# Patient Record
Sex: MALE | Race: BLACK OR AFRICAN AMERICAN | NOT HISPANIC OR LATINO | Employment: UNEMPLOYED | URBAN - METROPOLITAN AREA
[De-identification: names, ages, dates, MRNs, and addresses within clinical notes are randomized per-mention and may not be internally consistent; named-entity substitution may affect disease eponyms.]

---

## 2023-01-07 ENCOUNTER — HOSPITAL ENCOUNTER (EMERGENCY)
Facility: HOSPITAL | Age: 53
Discharge: HOME | End: 2023-01-07
Attending: EMERGENCY MEDICINE

## 2023-01-07 VITALS
SYSTOLIC BLOOD PRESSURE: 160 MMHG | WEIGHT: 195 LBS | RESPIRATION RATE: 16 BRPM | BODY MASS INDEX: 27.3 KG/M2 | OXYGEN SATURATION: 97 % | DIASTOLIC BLOOD PRESSURE: 90 MMHG | HEIGHT: 71 IN | TEMPERATURE: 98.4 F | HEART RATE: 90 BPM

## 2023-01-07 DIAGNOSIS — B02.9 HERPES ZOSTER WITHOUT COMPLICATION: Primary | ICD-10-CM

## 2023-01-07 LAB
HIV 1+2 AB+HIV1 P24 AG SERPL QL IA: NORMAL
HIV1 P24 AG SER QL: NORMAL

## 2023-01-07 RX ORDER — TETRACAINE HYDROCHLORIDE 5 MG/ML
2 SOLUTION OPHTHALMIC ONCE
Status: COMPLETED | OUTPATIENT
Start: 2023-01-07 | End: 2023-01-07

## 2023-01-07 RX ADMIN — FLUORESCEIN SODIUM 1 STRIP: 0.6 STRIP OPHTHALMIC at 03:58

## 2023-01-07 RX ADMIN — TETRACAINE HYDROCHLORIDE 2 DROP: 5 SOLUTION OPHTHALMIC at 03:58

## 2023-01-07 NOTE — DISCHARGE INSTRUCTIONS
Continue to take medications as directed by provider that evaluated you on 1/5/2023  I recommend that you call the number provided to schedule an appointment with a primary care provider within the next 1 week for reevaluation  Follow-up with ophthalmology  If you begin to have any eye pain, changes to vision and other signs/symptoms as discussed at bedside return to the emergency department for evaluation

## 2023-01-07 NOTE — ED PROVIDER NOTES
History  Chief Complaint   Patient presents with   • Rash     Pt c/o rash to R side of face  States it started last week  Diagnosed with shingles one week ago  C/o worsening pain to rash  Currently on prednisone, valtrex and percocet  Chris Kyle is a 46 y o  male       With a past medical history for CKD, sarcoidosis presenting today for right-sided facial rash  The patient was evaluated at [facility] for same on 1/5/2023  At that time he was diagnosed with herpes zoster  Patient presenting today because rash continues to be painful  Reporting eye pain  He had lesion inside of the mouth as well  Which was present during his evaluation at [facility]  Of ear pain as well  Patient has had symptoms like this in the past with previous herpes zoster flareup  He is currently on prednisone and Valtrex  Denies any further complaints at this time  None       Past Medical History:   Diagnosis Date   • CKD (chronic kidney disease)    • Sarcoidosis of lung ([facility] [address] )        No past surgical history on file  No family history on file  I have reviewed and agree with the history as documented  E-Cigarette/Vaping     E-Cigarette/Vaping Substances          Review of Systems   Skin: Positive for rash  All other systems reviewed and are negative  Physical Exam  Physical Exam  Vitals and nursing note reviewed  Constitutional:       General: He is not in acute distress  Appearance: He is well-developed  HENT:      Head: Normocephalic and atraumatic  Nose: Nose normal  No congestion  Mouth/Throat:      Mouth: Mucous membranes are moist       Pharynx: No posterior oropharyngeal erythema  Eyes:      Conjunctiva/sclera: Conjunctivae normal    Cardiovascular:      Rate and Rhythm: Normal rate and regular rhythm  Heart sounds: No murmur heard  Pulmonary:      Effort: Pulmonary effort is normal  No respiratory distress  Breath sounds: Normal breath sounds  Abdominal:      Palpations: Abdomen is soft  Tenderness: There is no abdominal tenderness  Musculoskeletal:         General: No swelling  Cervical back: Neck supple  Skin:     General: Skin is warm and dry  Capillary Refill: Capillary refill takes less than 2 seconds  Comments: right side of face with maculopapular rash to what appears to be branches of trigeminal nerve  Neurological:      General: No focal deficit present  Mental Status: He is alert and oriented to person, place, and time  Cranial Nerves: No cranial nerve deficit  Sensory: No sensory deficit  Motor: No weakness  Psychiatric:         Mood and Affect: Mood normal          Vital Signs  ED Triage Vitals [01/07/23 0217]   Temperature Pulse Respirations Blood Pressure SpO2   98 4 °F (36 9 °C) 90 16 160/90 97 %      Temp src Heart Rate Source Patient Position - Orthostatic VS BP Location FiO2 (%)   -- -- Sitting Left arm --      Pain Score       7           Vitals:    01/07/23 0217   BP: 160/90   Pulse: 90   Patient Position - Orthostatic VS: Sitting         Visual Acuity      ED Medications  Medications   fluorescein sodium sterile ophthalmic strip 1 strip (1 strip Right Eye Given by Other 1/7/23 0358)   tetracaine 0 5 % ophthalmic solution 2 drop (2 drops Right Eye Given by Other 1/7/23 0358)       Diagnostic Studies  Results Reviewed     Procedure Component Value Units Date/Time    RAPID HIV 1/2 AB-AG COMBO for 15years old and above [[id number]]  (Normal) Collected: 01/07/23 0320    Lab Status: Final result Specimen: Blood from Arm, Left Updated: 01/07/23 0400     Rapid HIV 1 AND 2 Non-Reactive     HIV-1 P24 Ag Screen Non-Reactive    Narrative:      Negative for HIV-1 p24 Antigen  Negative for HIV-1 and/or HIV-2 Antibody                   No orders to display              Procedures  Procedures         ED Course  ED Course as of 01/07/23 0506   Sat Jan 07, 2023   0346 Fluoroscein exam did not reveal any dendritic lesions, corneal abrasions, no milvia sign  Medical Decision Making  40-year-old male presenting today with painful rash to the right side of face  Recently diagnosed with herpes zoster, noted that herpes zoster diffuse on right side of face, however it is following the trigeminal nerve location, this is a unusual presentation for herpes zoster, however the rash appears consistent with this diagnosis and painful rash consistent with that as well  Differential diagnosis was broad and included but was not limited to other viral rash, SJS/TEN, scabies, less likely meningeal rash  This was discussed with the patient at bedside  I recommended very close follow-up with primary care provider and ophthalmology  I did a thorough eye examination using fluorescein, there was no Milvia sign, no dendritic lesions of the cornea, no other abnormalities noted  There is no swelling in or around the mouth, however the patient did have 1 small lesion to the inner cheek  This was about 1 cm x 1 cm with no swelling  I gave the patient's strict return criteria he demonstrated understanding  All questions answered appropriately  Herpes zoster without complication: acute illness or injury  Amount and/or Complexity of Data Reviewed  Labs: ordered  Risk  Prescription drug management  Disposition  Final diagnoses:   Herpes zoster without complication     Time reflects when diagnosis was documented in both MDM as applicable and the Disposition within this note     Time User Action Codes Description Comment    1/7/2023  4:40 AM Jaylene Precise Add [B02 9] Herpes zoster without complication       ED Disposition     ED Disposition   Discharge    Condition   Stable    Date/Time   Sat Jan 7, 2023  4:11 AM    Sukhjinder Cesar discharge to home/self care                 Follow-up Information     Follow up With Specialties Details Why Contact Info Additional 2000 [facility] Emergency Medicine Go to  If symptoms worsen 34 [facility] 109 [facility], [address] [place], [place], 510 [facility]  Call today To schedule an appointment for follow-up with primary care provider within the next 1 week for reevaluation  [address] Call today To schedule an appointment for follow-up 174 [facility] 830 [facility]  [phone]             There are no discharge medications for this patient            PDMP Review     None          ED Provider  Electronically Signed by           Petra Messer PA-C  01/07/23 4295

## 2023-01-19 ENCOUNTER — HOSPITAL ENCOUNTER (EMERGENCY)
Facility: HOSPITAL | Age: 53
Discharge: HOME | End: 2023-01-19
Attending: EMERGENCY MEDICINE

## 2023-01-19 VITALS
DIASTOLIC BLOOD PRESSURE: 99 MMHG | OXYGEN SATURATION: 97 % | HEART RATE: 105 BPM | SYSTOLIC BLOOD PRESSURE: 164 MMHG | TEMPERATURE: 98.8 F | RESPIRATION RATE: 18 BRPM

## 2023-01-19 DIAGNOSIS — H60.90 OTITIS EXTERNA: Primary | ICD-10-CM

## 2023-01-19 RX ORDER — OFLOXACIN 3 MG/ML
1 SOLUTION/ DROPS OPHTHALMIC 4 TIMES DAILY
Status: DISCONTINUED | OUTPATIENT
Start: 2023-01-19 | End: 2023-01-19 | Stop reason: HOSPADM

## 2023-01-19 RX ORDER — OFLOXACIN 3 MG/ML
5 SOLUTION AURICULAR (OTIC) DAILY
Qty: 1.75 ML | Refills: 0 | Status: SHIPPED | OUTPATIENT
Start: 2023-01-19 | End: 2023-01-26

## 2023-01-19 RX ORDER — AMOXICILLIN 500 MG/1
1000 CAPSULE ORAL 2 TIMES DAILY
Qty: 40 CAPSULE | Refills: 0 | Status: SHIPPED | OUTPATIENT
Start: 2023-01-19 | End: 2023-01-26

## 2023-01-19 RX ADMIN — OFLOXACIN 1 DROP: 3 SOLUTION/ DROPS OPHTHALMIC at 13:18

## 2023-01-19 NOTE — ED PROVIDER NOTES
History  Chief Complaint   Patient presents with   • Earache     Pt states having throbbing ear pain on right side with headache  Pt states recently treated for shingles on right side  Pt states taking tylenol no relief  Patient is a 70-year-old male past medical history of sarcoidosis, CKD presenting with ear pain  Patient states that he was recently treated for shingles and had resolution of rash to the right side of his face and states that the ear pain he was initially feeling with his shingles did improve however worsened again over the last 1 5 weeks  Notes constant throbbing pain to the area which is worse when he put a Q-tip in, radiating into his face and throat with swallowing  States he completed his shingles medication  Denies any drainage from his ear denies any fevers, upper respiratory symptoms, cough  Is taking Tylenol with minimal relief  None       Past Medical History:   Diagnosis Date   • CKD (chronic kidney disease)    • Sarcoidosis of lung ([facility] [address] )    • Shingles        No past surgical history on file  No family history on file  I have reviewed and agree with the history as documented  E-Cigarette/Vaping     E-Cigarette/Vaping Substances          Review of Systems   All other systems reviewed and are negative  Physical Exam  Physical Exam  Vitals reviewed  Constitutional:       General: He is not in acute distress  Appearance: Normal appearance  He is not ill-appearing  HENT:      Left Ear: Tympanic membrane and ear canal normal       Ears:      Comments: Patient has mild bulging of the TM with clear effusion, mild canal erythema as compared to the left which is normal, no mastoid tenderness, erythema or edema, no vesicles on the TM, no perforation     Nose: Nose normal       Mouth/Throat:      Mouth: Mucous membranes are moist    Eyes:      Conjunctiva/sclera: Conjunctivae normal    Cardiovascular:      Rate and Rhythm: Normal rate     Pulmonary:      Effort: Pulmonary effort is normal    Abdominal:      General: Abdomen is flat  Musculoskeletal:      Cervical back: Neck supple  Lymphadenopathy:      Cervical: Cervical adenopathy present  Skin:     General: Skin is warm and dry  Neurological:      General: No focal deficit present  Mental Status: He is alert  Psychiatric:         Mood and Affect: Mood normal          Vital Signs  ED Triage Vitals [01/19/23 1231]   Temperature Pulse Respirations Blood Pressure SpO2   98 8 °F (37 1 °C) 105 18 164/99 97 %      Temp Source Heart Rate Source Patient Position - Orthostatic VS BP Location FiO2 (%)   Oral Monitor -- Left arm --      Pain Score       --           Vitals:    01/19/23 1231   BP: 164/99   Pulse: 105         Visual Acuity      ED Medications  Medications   ofloxacin (OCUFLOX) 0 3 % ophthalmic solution 1 drop (has no administration in time range)       Diagnostic Studies  Results Reviewed     None                 No orders to display              Procedures  Procedures         ED Course                                             Medical Decision Making  Patient is a 66-year-old male past medical history of sarcoidosis, CKD presenting with ear pain  Patient is well-appearing at bedside with stable vitals and in no acute distress  On exam he has mild erythema of the canal, tenderness of the canal without otorrhea, mild bulging of the TM with clear effusion  Differential includes postherpetic neuralgia, otitis externa, otitis media  As patient has mild bulging of the TM and has more erythema to the canal with tenderness canal will give ofloxacin for otitis externa, have given prescription for amoxicillin that I have advised to begin if you notice worsening symptoms or fevers within the next several days and have advised ENT follow-up for otitis versus postherpetic neuralgia  Patient states he understands      Otitis externa: complicated acute illness or injury  Risk  Prescription drug management  Disposition  Final diagnoses:   Otitis externa     Time reflects when diagnosis was documented in both MDM as applicable and the Disposition within this note     Time User Action Codes Description Comment    1/19/2023  1:09 PM Tarik Short [H60 90] Otitis externa       ED Disposition     ED Disposition   Discharge    Condition   Stable    Date/Time   Thu Jan 19, 2023  1:11 PM    Comment   Mckenna Felipe discharge to home/self care  Follow-up Information     Follow up With Specialties Details Why Contact Info Additional Information    [facility] Schedule an appointment as soon as possible for a visit   [address] [place] [address], [address] [place] [address]          Patient's Medications   Discharge Prescriptions    AMOXICILLIN (AMOXIL) 500 MG CAPSULE    Take 2 capsules (1,000 mg total) by mouth 2 (two) times a day for 7 days       Start Date: 1/19/2023 End Date: 1/26/2023       Order Dose: 1,000 mg       Quantity: 40 capsule    Refills: 0    OFLOXACIN (FLOXIN) 0 3 % OTIC SOLUTION    Administer 5 drops to the right ear daily for 7 days       Start Date: 1/19/2023 End Date: 1/26/2023       Order Dose: 5 drops       Quantity: 1 75 mL    Refills: 0       No discharge procedures on file      PDMP Review     None          ED Provider  Electronically Signed by           Junito Pendleton DO  01/19/23 5650